# Patient Record
(demographics unavailable — no encounter records)

---

## 2025-03-27 NOTE — HISTORY OF PRESENT ILLNESS
[de-identified] : 58 yo F with PMHX HTN presents for evaluation of R hip and bilateral leg pain after a fall on 25. Patient notes that she was walking in the snow when her legs slipped out from underneath her and she fell onto her buttocks. She was helped up and brought by EMS to [place] ED where xrays were done showing no acute fracture or dislocation and she was discharged. She notes that she has had worsening pain in R groin and low back that radiates down the back of both legs and into her feet since her fall. she has only used ibuprofen intermittently for pain, no other interventions. No known history of back or hip pathology. Stairs worsen the pain but it also keeps her up at night.  She is leaving for [place] for father's  in 10 days and would like something for pain.

## 2025-03-27 NOTE — PHYSICAL EXAM
[de-identified] : Inspection: No evidence of scoliosis.   Palpation:  TTP with compression of ASIS bialterally, TTP on R PSIS    ROM Flexion: normal   Extension: normal Rotation: normal Lateral Bending: normal    Straight leg test: positive bilaterally, worse on R Slump test: positive on R  Sensation: Normal to light touch  Motor: 4/5 strength throughout L2-S1. Deep tendon reflexes are symmetrical, 3+ on bilateral knee, 2+ on bilateral ankles   Clonus: Negative Babinski: Negative   HIP and LEG Inspection Normal.  Palpation  Hip ROM Flexion: normal but painful Extension: normal but painful Internal rotation: normal but painful External rotation: normal but painful  Knee flexion- normal Knee extension- normal  Ankle ROM- normal    Hip/knee Motor Strength Knee flexion (hamstrings): 5/5 Knee extension (quadriceps): 5/5  Hip flexion: 3/5 painful bilaterally Hip extension: 5/5 Hip abduction: 5/5 Hip adduction: 5/5  Ankle Motor Strength Normal  Sensory index Normal. Distal pulses Normal.  Special Tests   Log roll: Positive on R DIXON: Positive bilaterally Stinchfield: could not preform  FAIR: positive bilaterally Straight leg raise: positive bilaterally [de-identified] :   Date: 2/9/25   Views: Xray Hip w/ pelvis 3 view R; Xray lumbar 2 views Performed at [facility]: Yes Impression:  Lumbar spine: Narrowing at L5-S1 disc interspace noted. Some narrowing at  L4-5 disc interspace also noted. Osteophytes throughout the lumbar spine.  There is some sclerotic and cystic changes on either side of the right  and left hip joint. Follow-up suggested as indicated clinically. No acute  fracture seen.  AP pelvis and right hip: Sclerotic and cystic changes on either side of  the right hip joint with narrowing of the hip joint. There is some  osteoarthritic changes are also seen on the left hip less prominent than  on the right. Osteoarthritic degenerative changes seen related to the  lumbar spine as described above. No acute fracture or dislocation.  These images were personally reviewed with original findings documented as above.

## 2025-03-27 NOTE — DISCUSSION/SUMMARY
[de-identified] : 60 yo F with PMHX HTN presents for evaluation of R hip and bilateral leg pain after a fall on 2/9/25. Pain has worsened since ED evaluation although patient has yet to try any real pharm/non-pharm interventions thus far. Xrays concerning for R hip OA and L5-S1 foraminal stenosis with lumbar degenerative changes. Suspect patient is suffering from acute lumbar radiculopathy with an exacerbation of R hip osteoarthritis.   Plan: 1. Start diclofenac 75 mg BID for pain and inflammation, prescription sent to pharmacy 2. Start Flexeril 10 mg TID prn, prescription sent to pharmacy  3. Offered gabapentin but patient has had negative experience in the past  4. Referral to physical therapy places, patient will start upon returning from [place] in 3 weeks 5. Follow up in 3 months to assess progress

## 2025-07-28 NOTE — HISTORY OF PRESENT ILLNESS
[de-identified] : 60 yo F with PMHX HTN presents for evaluation of R hip and bilateral leg pain after a fall on 25. Patient notes that she was walking in the snow when her legs slipped out from underneath her and she fell onto her buttocks. She was helped up and brought by EMS to [place] ED where xrays were done showing no acute fracture or dislocation and she was discharged. She notes that she has had worsening pain in R groin and low back that radiates down the back of both legs and into her feet since her fall. she has only used ibuprofen intermittently for pain, no other interventions. No known history of back or hip pathology. Stairs worsen the pain but it also keeps her up at night. She is leaving for [place] for father's  in 10 days and would like something for pain.  Interval history: Patient continues to have radiating pain down both of her legs with no relief of symptoms since last visit. Patient states she has been doing physical therapy twice a week and feels it has been helpful. She has pain with walking, navigating the stairs, and has difficulty sleeping. Diclofenac failed in giving significant relief of symptoms. She states that the pain reduces when he is leaning on something but intensifies when standing. She now states having pain and spasms in bilateral calves when getting out of bed in the morning that improves throughout the day. Here today for reevaluation.

## 2025-07-28 NOTE — PHYSICAL EXAM
[de-identified] : Inspection: No evidence of scoliosis.  Palpation: TTP with compression of ASIS bialterally, TTP on R PSIS  ROM Flexion: normal Extension: normal Rotation: normal Lateral Bending: normal   Straight leg test: positive bilaterally, worse on R Slump test: positive on R  Sensation: Normal to light touch  Motor: 4/5 strength throughout L2-S1. Deep tendon reflexes are symmetrical, 3+ on bilateral knee, 2+ on bilateral ankles  Clonus: Negative Babinski: Negative  HIP and LEG Inspection Normal.  Palpation  Hip ROM Flexion: normal but painful Extension: normal but painful Internal rotation: normal but painful External rotation: normal but painful  Knee flexion- normal Knee extension- normal  Ankle ROM- normal   Hip/knee Motor Strength Knee flexion (hamstrings): 5/5 Knee extension (quadriceps): 5/5  Hip flexion: 3/5 painful bilaterally Hip extension: 5/5 Hip abduction: 5/5 Hip adduction: 5/5  Ankle Motor Strength Normal  Sensory index Normal. Distal pulses Normal.  Special Tests Log roll: Positive on R DIXON: Positive bilaterally Stinchfield: could not preform FAIR: positive bilaterally Straight leg raise: positive bilaterally [de-identified] : Date: 2/9/25  Views: Xray Hip w/ pelvis 3 view R; Xray lumbar 2 views Performed at [facility]: Yes Impression: Lumbar spine: Narrowing at L5-S1 disc interspace noted. Some narrowing at L4-5 disc interspace also noted. Osteophytes throughout the lumbar spine. There is some sclerotic and cystic changes on either side of the right and left hip joint. Follow-up suggested as indicated clinically. No acute fracture seen.  AP pelvis and right hip: Sclerotic and cystic changes on either side of the right hip joint with narrowing of the hip joint. There is some osteoarthritic changes are also seen on the left hip less prominent than on the right. Osteoarthritic degenerative changes seen related to the lumbar spine as described above. No acute fracture or dislocation.  These images were personally reviewed with original findings documented as above.

## 2025-07-28 NOTE — DISCUSSION/SUMMARY
[de-identified] : 60 yo F with PMHX HTN presents for evaluation of R hip and bilateral leg pain after a fall on 2/9/25. Pain has worsened since ED evaluation although patient has yet to try any real pharm/non-pharm interventions thus far. Xrays concerning for R hip OA and L5-S1 foraminal stenosis with lumbar degenerative changes. Suspect patient is suffering from acute lumbar radiculopathy with an exacerbation of R hip osteoarthritis.  Patient with continued pain which appears to be radicular despite conservative measures.  Does not appear to have pain radiating from the hip OA at this time.  Plan: 1. Order MR lumbar spine  2. Updated referral given to physical therapy  3. Diclofenac 75 mg BID as needed prescribed 4. Flexeril 10 mg TID as needed prescribed  5. Referral given for pain management 6. If groin pains persist or recurs she may follow-up for hip related treatment otherwise would recommend follow-up with pain management given she has failed conservative measures for the lumbar pain.